# Patient Record
Sex: FEMALE | Race: OTHER | HISPANIC OR LATINO | Employment: UNEMPLOYED | URBAN - METROPOLITAN AREA
[De-identification: names, ages, dates, MRNs, and addresses within clinical notes are randomized per-mention and may not be internally consistent; named-entity substitution may affect disease eponyms.]

---

## 2023-03-24 ENCOUNTER — OFFICE VISIT (OUTPATIENT)
Dept: FAMILY MEDICINE | Facility: CLINIC | Age: 23
End: 2023-03-24
Payer: MEDICAID

## 2023-03-24 VITALS
HEIGHT: 61 IN | WEIGHT: 170 LBS | DIASTOLIC BLOOD PRESSURE: 56 MMHG | BODY MASS INDEX: 32.1 KG/M2 | OXYGEN SATURATION: 99 % | TEMPERATURE: 99 F | RESPIRATION RATE: 18 BRPM | HEART RATE: 61 BPM | SYSTOLIC BLOOD PRESSURE: 100 MMHG

## 2023-03-24 DIAGNOSIS — R51.9 NONINTRACTABLE HEADACHE, UNSPECIFIED CHRONICITY PATTERN, UNSPECIFIED HEADACHE TYPE: ICD-10-CM

## 2023-03-24 DIAGNOSIS — R45.89 DEPRESSED MOOD: ICD-10-CM

## 2023-03-24 DIAGNOSIS — Z3A.01 LESS THAN 8 WEEKS GESTATION OF PREGNANCY: Primary | ICD-10-CM

## 2023-03-24 DIAGNOSIS — Z32.01 PREGNANCY TEST POSITIVE: ICD-10-CM

## 2023-03-24 LAB
B-HCG UR QL: POSITIVE
CTP QC/QA: YES

## 2023-03-24 PROCEDURE — 81025 URINE PREGNANCY TEST: CPT | Mod: PBBFAC

## 2023-03-24 PROCEDURE — 99204 OFFICE O/P NEW MOD 45 MIN: CPT | Mod: PBBFAC

## 2023-03-24 NOTE — PROGRESS NOTES
[facility] Office Visit Note    Subjective:       Patient ID: Meeta Li is a 22 y.o. female.     used for entire duration of visit    HPI:  22 y.o. female presents to [facility] clinic for positive UPT at home.    Current Concerns: None, would like to establish prenatal care here  Nursing assessment with PHQ9 score of 12 - reports onset since finding out she is pregnant. States desire to keep baby. Denies any SI/HI.  Having some abd cramps with constipation. Denies any vaginal bleeding.  +nausea, denies vomiting, but comes close    Gestational History:  (date, GA, length labor, BW, sex, type, anes, place, complications)  - G1: vaginal, girl, full term, 2019, 3.3kg; no complications (in [place])  - G2: ectopic s/p SAB 2022 - stated she went to initial prenatal visit and fetus was found in fallopian tube; experienced SAB about 2wks after  - G3: current    Gyn History:   - LMP: 1/28/2023  - Age at menarche: 14 years  - Menstrual hx: regular, 30day cycles, 4pads/day, 4-5days days per period  - History of birth control: ocps  - History of STDs and/or Abnormal PAPs: denies, n/a pap    Past Medical History: denies  Surgical History: denies  Family History: mom - HTN, heart disease, seizures in grandmother  Social History: no tobacco, etoh, illicit drug use  Medications: denies    Antepartum specific ROS  - Vaginal bleeding: no  - Vaginal discharge: yes, clear. little  - Loss of fluid: no  - Contractions: no  - Headaches: yes, resolves with rest after 2 hrs, then return and lasts another few hours, drinks 3-4 0.5L bottles  - Vision changes: no  - Edema: no    General ROS  Constitutional: no fever, no chills, no weight loss.  CV: no swelling, no edema, no chest pain.  : no urinary retention, no urinary incontinence, denies dysuria  GI: see above hpi  RESP: no SOB, no wheezing, no difficulty breathing  Psych: see above hpi    Objective:      BP (!) 100/56 (BP Location: Right arm,  "Patient Position: Sitting, BP Method: Medium (Automatic))   Pulse 61   Temp 98.5 °F (36.9 °C) (Oral)   Resp 18   Ht 5' 1.02" (1.55 m)   Wt 77.1 kg (170 lb)   LMP 01/28/2023 (Exact Date)   SpO2 99%   BMI 32.10 kg/m²   Physical Exam:  Gen: alert and oriented, NAD  Resp: CTA bilaterally, nonlabored breathing  CV: RRR, no murmurs, no edema  Abd: gravid, nontender, +BS    No current outpatient medications       Assessment/Plan:     1. Less than 8 weeks gestation of pregnancy  Ambulatory referral/consult to Obstetrics / Gynecology      2. Pregnancy test positive  POCT urine pregnancy      3. Depressed mood            Estimated 7^6wga by LMP, referral to initial OB placed.   Pt interested in counseling services and prefers counseling over medical treatment at this time. SW messaged  Discussed increased po hydration for headache, however, pt may take tylenol if needed. Discussed to monitor cramps - may be expected during early pregnancy, but onset of vaginal bleeding would be concerning.     - OB protocol  - cont PNVs  - Strict labor and ED precautions discussed in depth: Fever, vaginal bleeding or leaking fluid, belly cramping or pain, shortness of breath, chest pain, swelling of the face, hands, ankles, feet, or leg; decreased fetal movement, changes in vision, severe headache that does not resolve with rest or Tylenol.    "

## 2023-03-28 ENCOUNTER — TELEPHONE (OUTPATIENT)
Dept: FAMILY MEDICINE | Facility: CLINIC | Age: 23
End: 2023-03-28

## 2023-03-28 NOTE — TELEPHONE ENCOUNTER
3/28/23    LCSW and SW Intern received a referral from Dr. Coker for assistance in finding counseling resources for the patient. SW Intern contacted the patient through the [place] language line [Interpretor: Jessica #[id number]]. The patient stated that she is still interested in receiving resources for counseling services. She stated that she would prefer in-person counseling. SW Intern offered to look into these resources that may be appropriate for the patient and in network with her insurance. SW Intern will contact the patient to share resources and will continue to follow up.  ------------------------------------------  3/29/23    SW Intern contacted the patient through the [place] language line [Interpretor: Dwain #[id number]] to share language appropriate counseling resources. The patient stated that she would prefer to have counseling resources mailed to her home. SW Intern confirmed the patient's address and LCSW mailed these resources to the patient's home in [place] and English:  Postpartum [employer] Support Groups in [place]  -Free, online support groups  - https://www.postpartum.net/en-espanol/  - 7-866-917-0450  SANIYA Batista, Counseling Intern   [address] [place] [address], [facility]   JUDE Muse [address]   Phone: [phone]   Bilingual; $60 a session   Private pay only; not in network with your insurance   [email or web address]  [facility] [address], [place] [address] [phone]  **They have an  line and some  availability**  -In network with your insurance  -Business Hours: Mon.-Fri. 8am-4:30pm  -[email or web address]   -Offers in-person and telehealth appointments  -Referral Process: Patient can call to initiate services  Merged with [facility]  -[phone]  -Intern who speaks [place] offering virtual counseling  -No fee for services    LCSW and LISSETH Intern will continue to follow  up.  ---------------------------------------------  4/6/23    LISSETH Craig attempted to contact the patient through the [place] language line [Interpretor: #[id number]] to follow up on whether she received counseling resources in the mail. However, the call was unsuccessful and LISSETH Craig was unable to leave a VM. LISSETH Craig will continue to follow up.  -----------------------------------------------  4/12/23    Tran Mason, contacted the patient through the [place] language line [Interpretor: Dinorah #[id number]]. The patient stated that she is doing well at this time, and she no longer feels that it is necessary to do counseling. LISSETH Craig encouraged the patient to call if anything changes and she decides that she would like to continue with a counselor. The patient agreed. LCSW and LISSETH Craig will be available as needed to provide further resources and support.

## 2023-03-31 ENCOUNTER — HOSPITAL ENCOUNTER (EMERGENCY)
Facility: HOSPITAL | Age: 23
Discharge: HOME | End: 2023-03-31
Attending: STUDENT IN AN ORGANIZED HEALTH CARE EDUCATION/TRAINING PROGRAM
Payer: MEDICAID

## 2023-03-31 VITALS
TEMPERATURE: 98 F | BODY MASS INDEX: 33.76 KG/M2 | WEIGHT: 171.94 LBS | SYSTOLIC BLOOD PRESSURE: 110 MMHG | DIASTOLIC BLOOD PRESSURE: 76 MMHG | HEIGHT: 60 IN | OXYGEN SATURATION: 98 % | HEART RATE: 68 BPM | RESPIRATION RATE: 16 BRPM

## 2023-03-31 DIAGNOSIS — N76.0 BV (BACTERIAL VAGINOSIS): Primary | ICD-10-CM

## 2023-03-31 DIAGNOSIS — O23.11 ACUTE CYSTITIS DURING PREGNANCY IN FIRST TRIMESTER: ICD-10-CM

## 2023-03-31 DIAGNOSIS — B96.89 BV (BACTERIAL VAGINOSIS): Primary | ICD-10-CM

## 2023-03-31 LAB
ALBUMIN SERPL-MCNC: 3.8 G/DL (ref 3.5–5)
ALBUMIN/GLOB SERPL: 1.1 RATIO (ref 1.1–2)
ALP SERPL-CCNC: 71 UNIT/L (ref 40–150)
ALT SERPL-CCNC: 33 UNIT/L (ref 0–55)
APPEARANCE UR: CLEAR
AST SERPL-CCNC: 17 UNIT/L (ref 5–34)
B-HCG FREE SERPL-ACNC: ABNORMAL MIU/ML
BACTERIA #/AREA URNS AUTO: ABNORMAL /HPF
BASOPHILS # BLD AUTO: 0.04 X10(3)/MCL (ref 0–0.2)
BASOPHILS NFR BLD AUTO: 0.4 %
BILIRUB UR QL STRIP.AUTO: NEGATIVE MG/DL
BILIRUBIN DIRECT+TOT PNL SERPL-MCNC: 0.2 MG/DL
BUN SERPL-MCNC: 7.3 MG/DL (ref 7–18.7)
C TRACH DNA SPEC QL NAA+PROBE: NOT DETECTED
CALCIUM SERPL-MCNC: 9.2 MG/DL (ref 8.4–10.2)
CAOX CRY URNS QL MICRO: ABNORMAL /HPF
CHLORIDE SERPL-SCNC: 108 MMOL/L (ref 98–107)
CLUE CELLS VAG QL WET PREP: ABNORMAL
CO2 SERPL-SCNC: 22 MMOL/L (ref 22–29)
COLOR UR AUTO: YELLOW
CREAT SERPL-MCNC: 0.69 MG/DL (ref 0.55–1.02)
EOSINOPHIL # BLD AUTO: 0.12 X10(3)/MCL (ref 0–0.9)
EOSINOPHIL NFR BLD AUTO: 1.1 %
ERYTHROCYTE [DISTWIDTH] IN BLOOD BY AUTOMATED COUNT: 13.7 % (ref 11.5–17)
GFR SERPLBLD CREATININE-BSD FMLA CKD-EPI: >60 MLS/MIN/1.73/M2
GLOBULIN SER-MCNC: 3.5 GM/DL (ref 2.4–3.5)
GLUCOSE SERPL-MCNC: 74 MG/DL (ref 74–100)
GLUCOSE UR QL STRIP.AUTO: NORMAL MG/DL
HCT VFR BLD AUTO: 39.3 % (ref 37–47)
HGB BLD-MCNC: 13.1 G/DL (ref 12–16)
HYALINE CASTS #/AREA URNS LPF: ABNORMAL /LPF
IMM GRANULOCYTES # BLD AUTO: 0.05 X10(3)/MCL (ref 0–0.04)
IMM GRANULOCYTES NFR BLD AUTO: 0.5 %
KETONES UR QL STRIP.AUTO: NEGATIVE MG/DL
LEUKOCYTE ESTERASE UR QL STRIP.AUTO: 250 UNIT/L
LYMPHOCYTES # BLD AUTO: 3.56 X10(3)/MCL (ref 0.6–4.6)
LYMPHOCYTES NFR BLD AUTO: 32.8 %
MCH RBC QN AUTO: 28.1 PG (ref 27–31)
MCHC RBC AUTO-ENTMCNC: 33.3 G/DL (ref 33–36)
MCV RBC AUTO: 84.3 FL (ref 80–94)
MONOCYTES # BLD AUTO: 0.71 X10(3)/MCL (ref 0.1–1.3)
MONOCYTES NFR BLD AUTO: 6.5 %
MUCOUS THREADS URNS QL MICRO: ABNORMAL /LPF
N GONORRHOEA DNA SPEC QL NAA+PROBE: NOT DETECTED
NEUTROPHILS # BLD AUTO: 6.38 X10(3)/MCL (ref 2.1–9.2)
NEUTROPHILS NFR BLD AUTO: 58.7 %
NITRITE UR QL STRIP.AUTO: NEGATIVE
NRBC BLD AUTO-RTO: 0 %
PH UR STRIP.AUTO: 5.5 [PH]
PLATELET # BLD AUTO: 282 X10(3)/MCL (ref 130–400)
PMV BLD AUTO: 11.4 FL (ref 7.4–10.4)
POTASSIUM SERPL-SCNC: 3.7 MMOL/L (ref 3.5–5.1)
PROT SERPL-MCNC: 7.3 GM/DL (ref 6.4–8.3)
PROT UR QL STRIP.AUTO: ABNORMAL MG/DL
RBC # BLD AUTO: 4.66 X10(6)/MCL (ref 4.2–5.4)
RBC #/AREA URNS AUTO: ABNORMAL /HPF
RBC UR QL AUTO: NEGATIVE UNIT/L
SODIUM SERPL-SCNC: 136 MMOL/L (ref 136–145)
SP GR UR STRIP.AUTO: 1.02
SQUAMOUS #/AREA URNS LPF: ABNORMAL /HPF
T VAGINALIS VAG QL WET PREP: ABNORMAL
UROBILINOGEN UR STRIP-ACNC: NORMAL MG/DL
WBC # SPEC AUTO: 10.9 X10(3)/MCL (ref 4.5–11.5)
WBC #/AREA URNS AUTO: ABNORMAL /HPF
WBC #/AREA VAG WET PREP: ABNORMAL
YEAST SPEC QL WET PREP: ABNORMAL

## 2023-03-31 PROCEDURE — 87210 SMEAR WET MOUNT SALINE/INK: CPT | Performed by: NURSE PRACTITIONER

## 2023-03-31 PROCEDURE — 80053 COMPREHEN METABOLIC PANEL: CPT | Performed by: NURSE PRACTITIONER

## 2023-03-31 PROCEDURE — 84702 CHORIONIC GONADOTROPIN TEST: CPT | Performed by: NURSE PRACTITIONER

## 2023-03-31 PROCEDURE — 81001 URINALYSIS AUTO W/SCOPE: CPT | Performed by: NURSE PRACTITIONER

## 2023-03-31 PROCEDURE — 87591 N.GONORRHOEAE DNA AMP PROB: CPT | Performed by: NURSE PRACTITIONER

## 2023-03-31 PROCEDURE — 99284 EMERGENCY DEPT VISIT MOD MDM: CPT | Mod: 25

## 2023-03-31 PROCEDURE — 85025 COMPLETE CBC W/AUTO DIFF WBC: CPT | Performed by: NURSE PRACTITIONER

## 2023-03-31 RX ORDER — METRONIDAZOLE 500 MG/1
500 TABLET ORAL EVERY 12 HOURS
Qty: 14 TABLET | Refills: 0 | Status: SHIPPED | OUTPATIENT
Start: 2023-03-31 | End: 2023-04-07

## 2023-03-31 RX ORDER — AMOXICILLIN AND CLAVULANATE POTASSIUM 500; 125 MG/1; MG/1
1 TABLET, FILM COATED ORAL 2 TIMES DAILY
Qty: 14 TABLET | Refills: 0 | Status: SHIPPED | OUTPATIENT
Start: 2023-03-31 | End: 2023-04-07

## 2023-03-31 RX ORDER — METOCLOPRAMIDE 10 MG/1
10 TABLET ORAL EVERY 6 HOURS PRN
Qty: 16 TABLET | Refills: 0 | Status: SHIPPED | OUTPATIENT
Start: 2023-03-31 | End: 2023-04-04

## 2023-03-31 NOTE — ED PROVIDER NOTES
Encounter Date: 3/31/2023       History     Chief Complaint   Patient presents with    Headache     PT REPORTS SHE IS ABOUT 9 WK OB, CO RANGEL, INTERMITTENT CRAMPING AND YELLOW VAG DC > 1 WK.  LMP 23. .  WAITING ON APT W OB. DENIEDS VAG BLEEDING.     ABD PAIN W PREGNANCY    Vaginal Discharge     Pt is a 22 y.o. female who presents to the [facility] ED complaining of pelvic pain, vaginal discharge which has been present x 1 week. Pt is approx 9 weeks pregnant. . Hx of ectopic pregnancy x 1. LMP was 23. Denies being seen yet by OB Services for this pregnancy. Denies chest pain, SOB, weakness, dizziness, fever, vaginal bleeding, or nausea/vomiting. Pt also reports mild headache which occurred 2 days ago. Denies current headache but describes pain symptoms as worsening with bright lights and mild nausea. Pt is [place] speaking,  used for all verbal communication.    Review of patient's allergies indicates:  No Known Allergies  History reviewed. No pertinent past medical history.  History reviewed. No pertinent surgical history.  History reviewed. No pertinent family history.  Social History     Tobacco Use    Smoking status: Never    Smokeless tobacco: Never     Review of Systems   Constitutional:  Negative for chills, diaphoresis, fatigue and fever.   HENT:  Negative for facial swelling, rhinorrhea, sinus pressure, sinus pain, sore throat and trouble swallowing.    Respiratory:  Negative for cough, chest tightness, shortness of breath and wheezing.    Cardiovascular:  Negative for chest pain, palpitations and leg swelling.   Gastrointestinal:  Positive for abdominal pain and nausea. Negative for diarrhea and vomiting.   Genitourinary:  Positive for pelvic pain and vaginal discharge. Negative for dysuria, flank pain, frequency, hematuria and urgency.   Musculoskeletal:  Negative for arthralgias, back pain, joint swelling and myalgias.   Skin:  Negative for color change and rash.   Neurological:   Positive for headaches. Negative for dizziness, syncope, weakness and light-headedness.   Hematological:  Does not bruise/bleed easily.   All other systems reviewed and are negative.    Physical Exam     Initial Vitals [03/31/23 1342]   BP Pulse Resp Temp SpO2   105/69 66 16 97.7 °F (36.5 °C) 98 %      MAP       --         Physical Exam    Nursing note and vitals reviewed.  Constitutional: She appears well-developed and well-nourished.   HENT:   Head: Normocephalic and atraumatic.   Nose: Nose normal.   Mouth/Throat: Oropharynx is clear and moist.   Eyes: Conjunctivae and EOM are normal. Pupils are equal, round, and reactive to light.   Neck: Neck supple.   Normal range of motion.  Cardiovascular:  Normal rate, regular rhythm, normal heart sounds and intact distal pulses.           Pulmonary/Chest: Effort normal and breath sounds normal. No respiratory distress. She has no wheezes. She has no rhonchi. She has no rales. She exhibits no tenderness.   Abdominal: Abdomen is soft and flat. Bowel sounds are normal. She exhibits no distension. There is abdominal tenderness in the suprapubic area. There is no rebound, no guarding, no tenderness at McBurney's point and negative Randall's sign. negative psoas sign  Genitourinary:    Pelvic exam was performed with patient supine.      Vaginal discharge (yellow, thick) present.      No vaginal tenderness or bleeding.   No tenderness or bleeding in the vagina.    No foreign body in the vagina.     Musculoskeletal:         General: Normal range of motion.      Cervical back: Normal range of motion and neck supple.     Neurological: She is alert and oriented to person, place, and time. She has normal strength and normal reflexes.   Skin: Skin is warm and dry. Capillary refill takes less than 2 seconds.   Psychiatric: She has a normal mood and affect. Her speech is normal and behavior is normal. Judgment and thought content normal.       ED Course   Procedures  Labs Reviewed   WET  PREP, GENITAL - Abnormal; Notable for the following components:       Result Value    Clue Cells, Wet Prep Many (*)     All other components within normal limits   COMPREHENSIVE METABOLIC PANEL - Abnormal; Notable for the following components:    Chloride 108 (*)     All other components within normal limits   URINALYSIS, REFLEX TO URINE CULTURE - Abnormal; Notable for the following components:    Protein, UA Trace (*)     Leukocyte Esterase,  (*)     Bacteria, UA Few (*)     Squamous Epithelial Cells, UA Many (*)     Mucous, UA Occ (*)     Calcium Oxalate Crystals, UA Occ (*)     All other components within normal limits   HCG, QUANTITATIVE - Abnormal; Notable for the following components:    Beta Human Chorionic Gonadotropin Quantitative 114,865.53 (*)     All other components within normal limits   CBC WITH DIFFERENTIAL - Abnormal; Notable for the following components:    MPV 11.4 (*)     IG# 0.05 (*)     All other components within normal limits   CHLAMYDIA/GONORRHOEAE(GC), PCR - Normal    Narrative:     The Xpert CT/NG test, performed on the Vputi system is a qualitative in vitro real-time polymerase chain reaction (PCR) test for the automated detected and differentiation for genomic DNA from Chlamydia trachomatis (CT) and/or Neisseria gonorrhoeae (NG).   CBC W/ AUTO DIFFERENTIAL    Narrative:     The following orders were created for panel order CBC auto differential.  Procedure                               Abnormality         Status                     ---------                               -----------         ------                     CBC with Differential[[id number]]        Abnormal            Final result                 Please view results for these tests on the individual orders.   EXTRA TUBES    Narrative:     The following orders were created for panel order EXTRA TUBES.  Procedure                               Abnormality         Status                     ---------                                -----------         ------                     Light Blue Top Hold[[id number]]                              In process                 Gold Top Hold[[id number]]                                    In process                   Please view results for these tests on the individual orders.   LIGHT BLUE TOP HOLD   GOLD TOP HOLD          Imaging Results              US OB <14 Wks TransAbd & TransVag, Single Gestation (XPD) (Final result)  Result time 03/31/23 18:35:13      Final result by Lex Ruiz MD (03/31/23 18:35:13)                   Impression:      Single viable intrauterine pregnancy with sonographic age of 8 weeks 5 days.  Appropriate fetal heart rate of 171 beats per minute.  Recommend appropriate follow-up with OB.      Electronically signed by: Lex Ruiz  Date:    03/31/2023  Time:    18:35               Narrative:    EXAMINATION:  US OB <14 WEEKS, TRANSABDOM & TRANSVAG, SINGLE GESTATION (XPD)    CLINICAL HISTORY:  Pelvic pain;    TECHNIQUE:  Transabdominal and transvaginal images of the pelvis were obtained. Images obtained in grayscale and color.    COMPARISON:  No prior imaging available for comparison.    FINDINGS:  Single viable intrauterine pregnancy with sonographic age of 8 weeks 5 days.  Appropriate fetal heart rate of 171 beats per minute.                                       Medications - No data to display  Medical Decision Making:   Differential Diagnosis:   UTI  Ectopic pregnancy  BV  VD  Anemia  Electrolyte imbalance  Clinical Tests:   Lab Tests: Ordered and Reviewed  Radiological Study: Ordered and Reviewed  ED Management:  6:47 PM Reassessed patient at this time. Reports initial pelvic discomfort has improved. I have discussed with pt in detail all of her results including her preliminary US results. Pt will be placed on medication for her BV as well as her UTI. Pt reports no being informed of her OB appointment date yet so I will create my own referral to ensure OB follow up  occurs. Denies being on a prenatal vitamin also. Discussed with patient all pertinent ED information and results. Discussed diagnosis and treatment plan with patient. Follow up instructions and return to ED instruction have been given. All questions and concerns were addressed at this time. Patient voices understanding of information and instructions. Patient is comfortable with plan and discharge. Patient is stable for discharge.        APC / Resident Notes:   Was not physically present during the history or exam of this patient.  Was available all times for consultation. (Nasim)                      Clinical Impression:   Final diagnoses:  [N76.0, B96.89] BV (bacterial vaginosis) (Primary)  [O23.11] Acute cystitis during pregnancy in first trimester        ED Disposition Condition    Discharge Stable          ED Prescriptions       Medication Sig Dispense Start Date End Date Auth. Provider    metroNIDAZOLE (FLAGYL) 500 MG tablet Take 1 tablet (500 mg total) by mouth every 12 (twelve) hours. Do not drink alcohol while on this medication for 7 days 14 tablet 3/31/2023 4/7/2023 Oliver Molina Jr., KAYDENP    metoclopramide HCl (REGLAN) 10 MG tablet Take 1 tablet (10 mg total) by mouth every 6 (six) hours as needed (nausea). 16 tablet 3/31/2023 4/4/2023 Oliver Molina Jr., KAYDENP    amoxicillin-clavulanate 500-125mg (AUGMENTIN) 500-125 mg Tab Take 1 tablet (500 mg total) by mouth 2 (two) times daily. for 7 days 14 tablet 3/31/2023 4/7/2023 Oliver Molina Jr., KAYDENP    PNV 11-iron fum-folic acid-om3 28 mg iron-1 mg -200 mg Cap Take 1 capsule by mouth Daily. 30 each 3/31/2023 3/30/2024 ALLAN Self Jr.          Follow-up Information    None          Oliver Molina Jr., ALLAN  03/31/23 1910       Jassi Rouse MD  03/2002

## 2023-05-16 ENCOUNTER — HOSPITAL ENCOUNTER (OUTPATIENT)
Dept: RADIOLOGY | Facility: HOSPITAL | Age: 23
Discharge: HOME | End: 2023-05-16
Attending: OBSTETRICS & GYNECOLOGY
Payer: MEDICAID

## 2023-05-16 ENCOUNTER — OFFICE VISIT (OUTPATIENT)
Dept: FAMILY MEDICINE | Facility: CLINIC | Age: 23
End: 2023-05-16
Payer: MEDICAID

## 2023-05-16 VITALS
RESPIRATION RATE: 20 BRPM | HEIGHT: 60 IN | HEART RATE: 79 BPM | DIASTOLIC BLOOD PRESSURE: 69 MMHG | TEMPERATURE: 98 F | BODY MASS INDEX: 32.32 KG/M2 | WEIGHT: 164.63 LBS | OXYGEN SATURATION: 99 % | SYSTOLIC BLOOD PRESSURE: 102 MMHG

## 2023-05-16 DIAGNOSIS — Z3A.01 LESS THAN 8 WEEKS GESTATION OF PREGNANCY: ICD-10-CM

## 2023-05-16 DIAGNOSIS — Z34.90 PREGNANCY: ICD-10-CM

## 2023-05-16 DIAGNOSIS — K21.9 GASTROESOPHAGEAL REFLUX DISEASE, UNSPECIFIED WHETHER ESOPHAGITIS PRESENT: ICD-10-CM

## 2023-05-16 DIAGNOSIS — Z3A.15 15 WEEKS GESTATION OF PREGNANCY: Primary | ICD-10-CM

## 2023-05-16 LAB
APPEARANCE UR: CLEAR
BACTERIA #/AREA URNS AUTO: ABNORMAL /HPF
BASOPHILS # BLD AUTO: 0.03 X10(3)/MCL
BASOPHILS NFR BLD AUTO: 0.3 %
BILIRUB SERPL-MCNC: NORMAL MG/DL
BILIRUB UR QL STRIP.AUTO: NEGATIVE MG/DL
BLOOD URINE, POC: NORMAL
CLARITY, POC UA: NORMAL
COLOR UR: YELLOW
COLOR, POC UA: NORMAL
EOSINOPHIL # BLD AUTO: 0.12 X10(3)/MCL (ref 0–0.9)
EOSINOPHIL NFR BLD AUTO: 1.3 %
ERYTHROCYTE [DISTWIDTH] IN BLOOD BY AUTOMATED COUNT: 13.9 % (ref 11.5–17)
GLUCOSE UR QL STRIP.AUTO: NORMAL MG/DL
GLUCOSE UR QL STRIP: NORMAL
GROUP & RH: NORMAL
HBV SURFACE AG SERPL QL IA: NONREACTIVE
HCT VFR BLD AUTO: 40.1 % (ref 37–47)
HCV AB SERPL QL IA: NONREACTIVE
HGB BLD-MCNC: 13.5 G/DL (ref 12–16)
HIV 1+2 AB+HIV1 P24 AG SERPL QL IA: NONREACTIVE
HYALINE CASTS #/AREA URNS LPF: ABNORMAL /LPF
IMM GRANULOCYTES # BLD AUTO: 0.03 X10(3)/MCL (ref 0–0.04)
IMM GRANULOCYTES NFR BLD AUTO: 0.3 %
INDIRECT COOMBS GEL: NORMAL
KETONES UR QL STRIP.AUTO: ABNORMAL MG/DL
KETONES UR QL STRIP: 15
LEUKOCYTE ESTERASE UR QL STRIP.AUTO: 75 UNIT/L
LEUKOCYTE ESTERASE URINE, POC: NORMAL
LYMPHOCYTES # BLD AUTO: 3.13 X10(3)/MCL (ref 0.6–4.6)
LYMPHOCYTES NFR BLD AUTO: 33.1 %
MCH RBC QN AUTO: 29 PG (ref 27–31)
MCHC RBC AUTO-ENTMCNC: 33.7 G/DL (ref 33–36)
MCV RBC AUTO: 86.2 FL (ref 80–94)
MONOCYTES # BLD AUTO: 0.51 X10(3)/MCL (ref 0.1–1.3)
MONOCYTES NFR BLD AUTO: 5.4 %
MUCOUS THREADS URNS QL MICRO: ABNORMAL /LPF
NEUTROPHILS # BLD AUTO: 5.63 X10(3)/MCL (ref 2.1–9.2)
NEUTROPHILS NFR BLD AUTO: 59.6 %
NITRITE UR QL STRIP.AUTO: NEGATIVE
NITRITE, POC UA: NORMAL
NRBC BLD AUTO-RTO: 0 %
PH UR STRIP.AUTO: 6.5 [PH]
PH, POC UA: 7
PLATELET # BLD AUTO: 254 X10(3)/MCL (ref 130–400)
PMV BLD AUTO: 12.1 FL (ref 7.4–10.4)
PROT UR QL STRIP.AUTO: ABNORMAL MG/DL
PROTEIN, POC: 30
RBC # BLD AUTO: 4.65 X10(6)/MCL (ref 4.2–5.4)
RBC #/AREA URNS AUTO: ABNORMAL /HPF
RBC UR QL AUTO: NEGATIVE UNIT/L
SP GR UR STRIP.AUTO: 1.02
SPECIFIC GRAVITY, POC UA: 1.02
SPECIMEN OUTDATE: NORMAL
SQUAMOUS #/AREA URNS LPF: ABNORMAL /HPF
T PALLIDUM AB SER QL: NONREACTIVE
UROBILINOGEN UR STRIP-ACNC: ABNORMAL MG/DL
UROBILINOGEN, POC UA: 1
WBC # SPEC AUTO: 9.45 X10(3)/MCL (ref 4.5–11.5)
WBC #/AREA URNS AUTO: ABNORMAL /HPF

## 2023-05-16 PROCEDURE — 76805 OB US >/= 14 WKS SNGL FETUS: CPT | Mod: TC

## 2023-05-16 PROCEDURE — 36415 COLL VENOUS BLD VENIPUNCTURE: CPT

## 2023-05-16 PROCEDURE — 86780 TREPONEMA PALLIDUM: CPT

## 2023-05-16 PROCEDURE — 85025 COMPLETE CBC W/AUTO DIFF WBC: CPT

## 2023-05-16 PROCEDURE — 87389 HIV-1 AG W/HIV-1&-2 AB AG IA: CPT

## 2023-05-16 PROCEDURE — 86762 RUBELLA ANTIBODY: CPT | Mod: 90

## 2023-05-16 PROCEDURE — 85660 RBC SICKLE CELL TEST: CPT

## 2023-05-16 PROCEDURE — 81511 FTL CGEN ABNOR FOUR ANAL: CPT | Mod: 90

## 2023-05-16 PROCEDURE — 99214 OFFICE O/P EST MOD 30 MIN: CPT | Mod: PBBFAC,25

## 2023-05-16 PROCEDURE — 86803 HEPATITIS C AB TEST: CPT

## 2023-05-16 PROCEDURE — 81001 URINALYSIS AUTO W/SCOPE: CPT

## 2023-05-16 PROCEDURE — 86787 VARICELLA-ZOSTER ANTIBODY: CPT | Mod: 90

## 2023-05-16 PROCEDURE — 81002 URINALYSIS NONAUTO W/O SCOPE: CPT | Mod: PBBFAC

## 2023-05-16 PROCEDURE — 86900 BLOOD TYPING SEROLOGIC ABO: CPT

## 2023-05-16 PROCEDURE — 87340 HEPATITIS B SURFACE AG IA: CPT

## 2023-05-16 RX ORDER — CALC/MAG/B COMPLEX/D3/HERB 61
15 TABLET ORAL DAILY
Qty: 30 CAPSULE | Refills: 2 | Status: SHIPPED | OUTPATIENT
Start: 2023-05-16 | End: 2023-06-13 | Stop reason: SDUPTHER

## 2023-05-16 NOTE — PROGRESS NOTES
[facility] OB OFFICE VISIT NOTE  Meeta Li  [id number]  2023    Chief Complaint: Initial Prenatal Visit and Headache (X 1 month comes and goes )      Meeta Li is a 22 y.o. female   at 15w3d GUY 2023 by LMP consistent with 2nd trimester U/S (5/15/23)presenting to [facility] for initial OB visit.    Current Issues: Headache started about a month ago, intermittent, rated 8/10. Also reports intermittent nausea associated with vomiting    Chronic Issues: Vaginal BV treated with flagyl 3/31/23, depressed mood- sees Lili Orona, LCSW      Gestational History:  (date, GA, length labor, BW, sex, type, anes, place, complications)  - G1: vaginal, girl, full term, 2019, 3.3kg; no complications (in [place])  - G2: ectopic s/p SAB  - stated she went to initial prenatal visit and fetus was found in fallopian tube? experienced SAB about 2wks after  - G3: current      Gyn History:   - LMP: 23  - Age at menarche: 14 years  - Menstrual hx: : regular, 30day cycles, 4pads/day, 4-5days days per period  - History of birth control: OCPs  - History of STDs and/or Abnormal PAPs: denies  - History of prior : none    Past Medical History: mom - HTN, heart disease, seizures in grandmother  Surgical History: denies  Family History: mom - HTN, heart disease, seizures in grandmother  Social History: denies tobacco use, etoh  Medications: PNV      Review of Systems  Gen: no fever, chills  Heart: no chest pain  Lungs: no SOB  : no hematuria  Abd: no diarrhea, vomiting, abd pain    Antepartum specific   - Fetal movements: no  - Vaginal bleeding: no  - Vaginal discharge: no  - Loss of fluid: no  - Contractions: no  - Headaches: yes (intermittent)  - Vision changes: no  - Edema: no    Blood pressure 102/69, pulse 79, temperature 98.1 °F (36.7 °C), temperature source Oral, resp. rate 20, height 5' (1.524 m), weight 74.7 kg (164 lb 9.6 oz), last menstrual period 2023, SpO2 99 %.   Physical Exam  Gen: in no  acute distress  CVS: RRR, no r/g/m  Lungs: CTABL  Abd: gravid, nR, +BS  Psych: AOx3  FHT: 145 by doppler US      Current Medications:   Current Outpatient Medications   Medication Sig Dispense Refill    PNV 11-iron fum-folic acid-om3 28 mg iron-1 mg -200 mg Cap Take 1 capsule by mouth Daily. 30 each 1     No current facility-administered medications for this visit.       Labs:  Urine dipstick:   Component 09:05   Color, UA Dark Yellow    pH, UA 7.0    WBC, UA small    Nitrite, UA neg    Protein, POC 30    Glucose, UA neg    Ketones, UA 15    Urobilinogen, UA 1.0    Bilirubin, POC neg    Blood, UA neg    Clarity, UA Slightly Cloudy    Spec Grav UA 1.020        Initial OB Labs ordered   - Blood Type and Rh:   - Antibody Screen:   - CBC H/H:   - HIV:   - RPR:   - GC: not detected 3/31/23  - CT: not detected 3/31/23  - HBsAg:   - HCVAb:   - Rubella:   - Varicella:   - UA & Culture:   - Sickle Cell Screen:   - PAP: Not indicated due to age (age 22)  - Influenza vaccine date: out of date    15-20 Weeks Lab  Ordered  - Quad Screen:     28 Week Lab  - 1H GTT:   - Rhogam:   - Date of Tdap:   - CBC H/H:   - RPR:   - BTL consent:     37 Week Lab  - CBC H/H:   - RPR:   - GBS Culture:   - HIV:   - Cervical GC:     Imaging:   Initial US: 5/15/23  Impression:   Single intrauterine pregnancy with a viable fetus in a breech presentation with a fetal heart rate of 154 beats per minute.   Estimated age by ultrasound 15 weeks and 4 days +/-1 week 1 day.   Estimated date of delivery by ultrasound November 3, 2023   Estimated fetal weight (had lock) 45.3%          Anatomy Scan:    Assessment:   1. 15 weeks gestation of pregnancy          Plan:  - OB Protocol   - Continue PNVs  - Urine dip reviewed as above  - Indicated labs: PENDING  - Mother plans to breast feeding  - Postpartum contraception discussion: not discussed  - Labor precautions discussed in depth  -ED precautions given    Acid Reflux  -Reports intermittent nausea associated  with vomiting  -Rx sent for Prevacid   -reassess next follow up    Headache  -Intermittent, rated 7-8/10  -Advised on tylenol   -wnl    - Return to clinic in 4 weeks     Julian Dolan  [facility] HO-1

## 2023-05-16 NOTE — PATIENT INSTRUCTIONS
Well Child Exam    About this topic  A well child exam is a visit with your child's doctor to check your child's health. The doctor will check your child's growth, progress, and shot record. It is also a time for you to ask your child's doctor any questions you have about your child's health. Your child will have a full exam during the office visit. Other things that are sometimes checked are hearing, eyesight, and urine or blood tests. The doctor may give shots during your child's well visit.    General    Getting Ready for a Well Child Exam    A well child exam is a good time for you to talk with your child's doctor about any of these topics:    Eating habits or diet    How your child acts    Sleep issues    Growth    Safety    Vaccines    Toilet training    Teen years    How your child is doing in school or any learning concerns    Home life    You may want to make a written list of the things you want to talk about with your child's doctor. Be sure to bring your list of questions to your child's well visit. You may also want to do some research on your own before your office visit by reading books or looking at Web sites. Other family members, child caregivers, and grandparents may be able to help you too. Your child's doctor may ask also you about your family's health history or if your child is around anyone who smokes.    The Exam    The doctor measures your child's weight, height, and sometimes head size or body mass index (BMI). The doctor plots these numbers on a growth curve. The growth curve gives a picture of your baby's growth at each visit. The doctor may check your child's temperature, blood pressure, breathing, and heart rate. The doctor may listen to your child's heart, lungs, and belly. Your doctor will do a full exam of your child from the head to the toes.    Growth and Development Questions    Your doctor will ask you about your child's progress. The doctor will focus on the skills that are  likely to happen at your child's age. Some of these are motor skills like rolling over, walking, and running, while others are social skills, or how your child interacts with other people. Your child's doctor will also ask you how your child is doing in school.    Help for Parents    Your doctor will talk with you about any concerns you have about your child during this visit. The doctor may also talk with you about:    Getting family help or other support    Ways to help your child's brain growth    How your child plays and acts with others    Ways to help your child exercise    Safety    Eating habits    Vaccines    Quitting smoking    Help if you have a low mood after having a baby    Shots or Vaccines    It is important for your child to get shots on time. This protects from very serious illnesses like pertussis, measles, or some kinds of pneumonia. Sometimes, your child may need more than one dose of vaccine. The vaccines used today are safer than ever. Talk to your doctor if you have any questions or concerns about giving your child vaccines.    Well Child Exam Schedule    The American Academy of Pediatrics (AAP) suggests this plan for well child visits:    [place] (3 to 5 days old)    1 month old    2 months old    4 months old    6 months old    9 months old    12 months old    15 months old    18 months old    2 years old    30 months old    3 years old    4 years old    Once each year until age 21    Well child exams are very important. Since your child is healthy at this visit and it is scheduled ahead of time, you can think about things you want to ask your child's doctor. Be sure to follow the above plan for well child visits as well as any other visits your child's doctor suggests.    Where can I learn more?    Centers for Disease Control and Prevention    http://www.cdc.gov/vaccines     Healthy  Children    https://www.healthychildren.org/English/family-life/health-management/Pages/Well-Child-Care-A-Check-Up-for-Success.aspx    Disclaimer.  This generalized information is a limited summary of diagnosis, treatment, and/or medication information. It is not meant to be comprehensive and should be used as a tool to help the user understand and/or assess potential diagnostic and treatment options. It does NOT include all information about conditions, treatments, medications, side effects, or risks that may apply to a specific patient. It is not intended to be medical advice or a substitute for the medical advice, diagnosis, or treatment of a health care provider based on the health care provider's examination and assessment of a patients specific and unique circumstances. Patients must speak with a health care provider for complete information about their health, medical questions, and treatment options, including any risks or benefits regarding use of medications. This information does not endorse any treatments or medications as safe, effective, or approved for treating a specific patient. [employer] and its affiliates disclaim any warranty or liability relating to this information or the use thereof. The use of this information is governed by the Terms of Use, available at Terms of Use. ©2022 [employer], Inc. and its affiliates and/or licensors. All rights reserved.

## 2023-05-17 DIAGNOSIS — Z3A.15 15 WEEKS GESTATION OF PREGNANCY: Primary | ICD-10-CM

## 2023-05-17 LAB
HGB S BLD QL SOLY: NEGATIVE
RUBV IGG SERPL IA-ACNC: 1.3
RUBV IGG SERPL QL IA: POSITIVE
VZV IGG SER IA-ACNC: 1.9
VZV IGG SER QL IA: POSITIVE

## 2023-05-17 NOTE — PROGRESS NOTES
I have personally reviewed the review of systems (ROS) and past, family and social histories (PFSH) documented above by the resident.  I have reviewed the care furnished by the resident during the encounter, including a review of the patient's medical history, the resident's findings on physical examination, diagnosis, and the treatment plan.  I participated in the management of the patient and was immediately available throughout the encounter.   I was physically present during all key portions of the service(s) provided with the resident.  Services were furnished in a primary care center located in the outpatient department of a [facility].

## 2023-05-19 LAB
# FETUSES: NORMAL
2ND TRIMESTER 4 SCREEN SERPL-IMP: NORMAL
AFP ADJ MOM SERPL: 0.72 MOM
AFP SERPL IA-MCNC: 21.2 NG/ML
AGE AT DELIVERY: NORMAL
B-HCG ADJ MOM SERPL: 1.08 MOM
CHORION TYPE: NORMAL
COLLECT DATE: NORMAL
CURRENT SMOKER: NORMAL
FET TS 21 RISK FROM MAT AGE: NORMAL
GA METHOD: NORMAL
GA US.COMPOSITE.EST: NORMAL WK,D
HCG SERPL IA-ACNC: 43.5 IU/ML
HX OF NTD QL: NO
HX OF NTD QL: NO
HX OF TRISOMY 21 QL: NO
IDDM PATIENT QL: NO
INHIBIN A ADJ MOM SERPL: 0.99 MOM
INHIBIN SERPL-MCNC: 148 PG/ML
IVF PREGNANCY: NO
LABORATORY COMMENT REPORT: NORMAL
M PHYSICIAN PHONE NUMBER: NORMAL
MATERNAL RISK FACTORS: NORMAL
NEURAL TUBE DEFECT RISK FETUS: NORMAL %
RECOM F/U: NORMAL
TEST PERFORMANCE INFO SPEC: NORMAL
TS 18 RISK FETUS: NORMAL
TS 21 RISK FETUS: NORMAL
U ESTRIOL ADJ MOM SERPL: 0.99 MOM
U ESTRIOL SERPL-MCNC: 0.65 NG/ML

## 2023-06-12 NOTE — PROGRESS NOTES
OB Office Visit Note    Name: Meeta Li  MRN: [id number]  Date: 2023    Subjective:      Chief Complaint: Routine Prenatal Visit (OB 19^3)      Meeta Li is a 22 y.o.  at 19w3d with GUY 2023, by Last Menstrual Period here for routine OB visit.     Current issues: concerned for having parasites in her feces, visible white and small moving spots on stool since 2 weeks. No diarrhea, rather patient is constipated, has BM every 3-4 days and her stool amount is small amount. Has not been taking PNV because it causes her to vomit. Last BM yesterday, small amount.   Also c/o upper abdominal pain that is worse with eating.     Chronic issues: none    Antepartum specific ROS  - Fetal movements: Yes - flutters  - Vaginal bleeding: No  - Vaginal discharge: Yes - small, yellow  - Loss of fluid: No  - Contractions: No  - Headaches: No  - Vision changes: No  - Edema: No      Review of Systems  Constitutional: no fever, no chills  CV: no chest pain  RESP: no SOB  : no dysuria, no hematuria  GI: +constipation, no diarrhea, no nausea, no vomiting  Psych: no depression, no anxiety; No SI/HI      Meds:   Prior to Admission medications    Medication Sig Start Date End Date Taking? Authorizing Provider   lansoprazole (PREVACID) 15 MG capsule Take 1 capsule (15 mg total) by mouth once daily. 5/16/23 5/15/24  Julian Dolan MD   PNV 11-iron fum-folic acid-om3 28 mg iron-1 mg -200 mg Cap Take 1 capsule by mouth Daily. 3/31/23 3/30/24  Oliver Molina Jr., FNP     Allergies: Review of patient's allergies indicates:  No Known Allergies    Gestational History:   OB History    Para Term  AB Living   3 1 1 0 1 1   SAB IAB Ectopic Multiple Live Births   1 0 0 0 1      # Outcome Date GA Lbr Porfirio/2nd Weight Sex Delivery Anes PTL Lv   3 Current            2 SAB  7w0d          1 Term 19 40w0d  3.3 kg (7 lb 4.4 oz) F Vag-Spont EPI  BENJA           Objective:      Vitals:    23 0837   BP:  97/67   BP Location: Right arm   Patient Position: Sitting   BP Method: Medium (Automatic)   Pulse: 79   Temp: 98.1 °F (36.7 °C)   TempSrc: Oral   SpO2: 99%   Weight: 75.7 kg (166 lb 12.8 oz)   Height: 5' (1.524 m)         General:   RESP: clear to auscultation bilaterally, non labored  CV: regular rate and rhythm, no murmurs, no edema  ABD: gravid, nontender, BS+ soft, nontender, nondistended, no abnormal masses, no epigastric pain and FHT present  FHTs: 140 bpm;   Fundal height: 20 cm  Cervix: not digitally examined    Initial OB Labs: Ordered 5/16/23  - Blood Type and Rh: O+  - Antibody Screen: negative  - CBC H/H: 13.5/40.1  - HIV: NR  - RPR: NR  - GC: not detected  - CT: not detected  - HBsAg: NR  - HCVAb: NR  - Rubella: immune  - Varicella: immune  - UA & Culture: UA trace protein, trace bacteria, 0-5 WBC  - Sickle Cell Screen: negative  - PAP: not done, pt is 21 yo    15-20 Weeks: Lab Ordered 5/16/23  - Quad Screen: normal risk    - 20 wk anatomy US: appt on 6/21, pending    Imaging  3/31 US OB < 14 wga :Single viable intrauterine pregnancy with sonographic age of 8 weeks 5 days.  Appropriate fetal heart rate of 171 beats per minute.     5/16/23 US OB > 14 wga : Single intrauterine pregnancy with a viable fetus in a breech presentation with a fetal heart rate of 154 beats per minute.   Estimated age by ultrasound 15 weeks and 4 days +/-1 week 1 day.   Estimated date of delivery by ultrasound November 3, 2023   Estimated fetal weight (had lock) 45.3%    Urine dip:  Lab Results   Component Value Date    COLORU Yellow 06/13/2023    SPECGRAV 1.015 06/13/2023    PHUR 8.5 06/13/2023    WBCUR TRACE 06/13/2023    NITRITE NEGATIVE 06/13/2023    PROTEINPOC NEGATIVE 06/13/2023    GLUCOSEUR NEGATIVE 06/13/2023    KETONESU NEGATIVE 06/13/2023    UROBILINOGEN Normal 06/13/2023    BILIRUBINPOC NEGATIVE 06/13/2023    RBCUR NEGATIVE 06/13/2023     Assessment/Plan:     Meeta was seen today for routine prenatal  visit.    Diagnoses and all orders for this visit:    Pregnancy with 19 completed weeks gestation  -     POCT urine dipstick without microscope  -     OB Protocol   -     PNVs  -     Urine dip reviewed as above, sent off for UA with reflex culture  -     Routine (initial) labs: reviewed, as mentioned above  -     Labor precautions discussed in depth    Parasites in stool  -     Stool Exam-Ova,Cysts,Parasites  - Collection kit given to pt, instructed to return it to lab for analysis    Gastroesophageal reflux disease, unspecified whether esophagitis present  -     lansoprazole (PREVACID) 15 MG capsule; Take 1 capsule (15 mg total) by mouth once daily.      Return to clinic in Follow up in about 4 weeks (around 7/11/2023) for routine OB.     Maia Marin MD  [facility]

## 2023-06-13 ENCOUNTER — OFFICE VISIT (OUTPATIENT)
Dept: FAMILY MEDICINE | Facility: CLINIC | Age: 23
End: 2023-06-13
Payer: MEDICAID

## 2023-06-13 VITALS
DIASTOLIC BLOOD PRESSURE: 67 MMHG | SYSTOLIC BLOOD PRESSURE: 97 MMHG | HEART RATE: 79 BPM | HEIGHT: 60 IN | BODY MASS INDEX: 32.75 KG/M2 | OXYGEN SATURATION: 99 % | TEMPERATURE: 98 F | WEIGHT: 166.81 LBS

## 2023-06-13 DIAGNOSIS — B82.9 PARASITES IN STOOL: ICD-10-CM

## 2023-06-13 DIAGNOSIS — Z3A.19 PREGNANCY WITH 19 COMPLETED WEEKS GESTATION: Primary | ICD-10-CM

## 2023-06-13 DIAGNOSIS — K21.9 GASTROESOPHAGEAL REFLUX DISEASE, UNSPECIFIED WHETHER ESOPHAGITIS PRESENT: ICD-10-CM

## 2023-06-13 LAB
APPEARANCE UR: CLEAR
BACTERIA #/AREA URNS AUTO: ABNORMAL /HPF
BILIRUB SERPL-MCNC: NEGATIVE MG/DL
BILIRUB UR QL STRIP.AUTO: NEGATIVE MG/DL
BLOOD URINE, POC: NEGATIVE
CLARITY, POC UA: NORMAL
COLOR UR: ABNORMAL
COLOR, POC UA: YELLOW
GLUCOSE UR QL STRIP.AUTO: NORMAL MG/DL
GLUCOSE UR QL STRIP: NEGATIVE
HYALINE CASTS #/AREA URNS LPF: ABNORMAL /LPF
KETONES UR QL STRIP.AUTO: NEGATIVE MG/DL
KETONES UR QL STRIP: NEGATIVE
LEUKOCYTE ESTERASE UR QL STRIP.AUTO: 75 UNIT/L
LEUKOCYTE ESTERASE URINE, POC: NORMAL
MUCOUS THREADS URNS QL MICRO: ABNORMAL /LPF
NITRITE UR QL STRIP.AUTO: NEGATIVE
NITRITE, POC UA: NEGATIVE
PH UR STRIP.AUTO: 7.5 [PH]
PH, POC UA: 8.5
PROT UR QL STRIP.AUTO: ABNORMAL MG/DL
PROTEIN, POC: NEGATIVE
RBC #/AREA URNS AUTO: ABNORMAL /HPF
RBC UR QL AUTO: NEGATIVE UNIT/L
SP GR UR STRIP.AUTO: 1.01
SPECIFIC GRAVITY, POC UA: 1.01
SQUAMOUS #/AREA URNS LPF: ABNORMAL /HPF
UROBILINOGEN UR STRIP-ACNC: NORMAL MG/DL
UROBILINOGEN, POC UA: 1
WBC #/AREA URNS AUTO: ABNORMAL /HPF

## 2023-06-13 PROCEDURE — 99213 OFFICE O/P EST LOW 20 MIN: CPT | Mod: PBBFAC

## 2023-06-13 PROCEDURE — 81002 URINALYSIS NONAUTO W/O SCOPE: CPT | Mod: PBBFAC

## 2023-06-13 PROCEDURE — 81001 URINALYSIS AUTO W/SCOPE: CPT

## 2023-06-13 RX ORDER — CALC/MAG/B COMPLEX/D3/HERB 61
15 TABLET ORAL DAILY
Qty: 30 CAPSULE | Refills: 0 | Status: SHIPPED | OUTPATIENT
Start: 2023-06-13 | End: 2024-06-12

## 2023-06-13 NOTE — PROGRESS NOTES
I have discussed the case with the resident and reviewed the resident's history and physical, assessment, plan, and progress note. I agree with the findings.       Neptali Novak MD  [facility]

## 2023-07-05 ENCOUNTER — PROCEDURE VISIT (OUTPATIENT)
Dept: MATERNAL FETAL MEDICINE | Facility: CLINIC | Age: 23
End: 2023-07-05
Payer: MEDICAID

## 2023-07-05 DIAGNOSIS — Z36.89 ENCOUNTER FOR FETAL ANATOMIC SURVEY: ICD-10-CM

## 2023-07-05 DIAGNOSIS — Z36.89 ENCOUNTER FOR FETAL ANATOMIC SURVEY: Primary | ICD-10-CM

## 2023-07-05 PROCEDURE — 76805 OB US >/= 14 WKS SNGL FETUS: CPT | Mod: S$GLB,,, | Performed by: OBSTETRICS & GYNECOLOGY

## 2023-07-05 PROCEDURE — 76805 US MFM PROCEDURE (VIEWPOINT): ICD-10-PCS | Mod: S$GLB,,, | Performed by: OBSTETRICS & GYNECOLOGY

## 2023-07-12 ENCOUNTER — OFFICE VISIT (OUTPATIENT)
Dept: FAMILY MEDICINE | Facility: CLINIC | Age: 23
End: 2023-07-12
Payer: MEDICAID

## 2023-07-12 VITALS
HEART RATE: 84 BPM | OXYGEN SATURATION: 100 % | RESPIRATION RATE: 18 BRPM | TEMPERATURE: 97 F | SYSTOLIC BLOOD PRESSURE: 113 MMHG | BODY MASS INDEX: 33.38 KG/M2 | DIASTOLIC BLOOD PRESSURE: 64 MMHG | WEIGHT: 170 LBS | HEIGHT: 60 IN

## 2023-07-12 DIAGNOSIS — Z3A.23 PREGNANCY WITH 23 COMPLETED WEEKS GESTATION: Primary | ICD-10-CM

## 2023-07-12 LAB
BILIRUB SERPL-MCNC: NORMAL MG/DL
BLOOD URINE, POC: NORMAL
CLARITY, POC UA: CLEAR
COLOR, POC UA: YELLOW
GLUCOSE UR QL STRIP: NORMAL
KETONES UR QL STRIP: NORMAL
LEUKOCYTE ESTERASE URINE, POC: NORMAL
NITRITE, POC UA: NORMAL
PH, POC UA: 7.5
PROTEIN, POC: NORMAL
SPECIFIC GRAVITY, POC UA: 1.02
UROBILINOGEN, POC UA: 0.2

## 2023-07-12 PROCEDURE — 81002 URINALYSIS NONAUTO W/O SCOPE: CPT | Mod: PBBFAC | Performed by: STUDENT IN AN ORGANIZED HEALTH CARE EDUCATION/TRAINING PROGRAM

## 2023-07-12 PROCEDURE — 99213 OFFICE O/P EST LOW 20 MIN: CPT | Mod: PBBFAC | Performed by: STUDENT IN AN ORGANIZED HEALTH CARE EDUCATION/TRAINING PROGRAM

## 2023-07-12 NOTE — PROGRESS NOTES
OB Office Visit Note    Name: Meeta Li  MRN: [id number]  Date: 2023    Subjective:      Chief Complaint: Routine Prenatal Visit ([id number]  pregnant)      Meeta Li is a 22 y.o.  at 23w4d with GUY 2023, by Last Menstrual Period here for routine OB visit.     Current issues: none, states her constipation and what she thought was parasites in her feces are no longer present.     Chronic issues: none    Antepartum specific ROS  - Fetal movements: Yes  - Vaginal bleeding: No  - Vaginal discharge: No  - Loss of fluid: No  - Contractions: No  - Headaches: No  - Vision changes: No  - Edema: No      Review of Systems  Constitutional: no fever, no chills  CV: no chest pain  RESP: no SOB  : no dysuria, no hematuria  GI:  no diarrhea, no nausea, no vomiting  Psych: no depression, no anxiety; No SI/HI      Meds:   Prior to Admission medications    Medication Sig Start Date End Date Taking? Authorizing Provider   lansoprazole (PREVACID) 15 MG capsule Take 1 capsule (15 mg total) by mouth once daily. 5/16/23 5/15/24  Julian Dolan MD   PNV 11-iron fum-folic acid-om3 28 mg iron-1 mg -200 mg Cap Take 1 capsule by mouth Daily. 3/31/23 3/30/24  Oliver Molina Jr., P     Allergies: Review of patient's allergies indicates:  No Known Allergies    Gestational History:   OB History    Para Term  AB Living   3 1 1 0 1 1   SAB IAB Ectopic Multiple Live Births   1 0 0 0 1      # Outcome Date GA Lbr Porfirio/2nd Weight Sex Delivery Anes PTL Lv   3 Current            2 SAB  7w0d          1 Term 19 40w0d  3.3 kg (7 lb 4.4 oz) F Vag-Spont EPI  BENJA           Objective:      Vitals:    23 0909 23 0917   BP: 99/62 113/64   BP Location: Left arm Left arm   Patient Position: Sitting Sitting   BP Method: Large (Automatic) Large (Manual)   Pulse: 84    Resp: 18    Temp: 97.4 °F (36.3 °C)    TempSrc: Oral    SpO2: 100%    Weight: 77.1 kg (170 lb)    Height: 5' (1.524 m)           General:   RESP: clear to auscultation bilaterally, non labored  CV: regular rate and rhythm, no murmurs, no edema  ABD: gravid, nontender, BS+ soft, nontender, nondistended, no abnormal masses, no epigastric pain and FHT present  FHTs: 137 bpm;   Fundal height: 23 cm  Cervix: not digitally examined    Initial OB Labs: Ordered 5/16/23  - Blood Type and Rh: O+  - Antibody Screen: negative  - CBC H/H: 13.5/40.1  - HIV: NR  - RPR: NR  - GC: not detected  - CT: not detected  - HBsAg: NR  - HCVAb: NR  - Rubella: immune  - Varicella: immune  - UA & Culture: UA trace protein, trace bacteria, 0-5 WBC  - Sickle Cell Screen: negative  - PAP: not done, pt is 23 yo    15-20 Weeks: Lab Ordered 5/16/23  - Quad Screen: normal risk    - 20 wk anatomy US: A duncan living IUP is identified.   Fetal size is appropriate for established dating.   No fetal structural malformations are identified.   Cervical length by TA scanning is normal.   Placental location is anterior without evidence of previa.     Imaging  3/31 US OB < 14 wga :Single viable intrauterine pregnancy with sonographic age of 8 weeks 5 days.  Appropriate fetal heart rate of 171 beats per minute.     5/16/23 US OB > 14 wga : Single intrauterine pregnancy with a viable fetus in a breech presentation with a fetal heart rate of 154 beats per minute.   Estimated age by ultrasound 15 weeks and 4 days +/-1 week 1 day.   Estimated date of delivery by ultrasound November 3, 2023   Estimated fetal weight (had lock) 45.3%    Urine dip:  Lab Results   Component Value Date    COLORU Yellow 07/12/2023    SPECGRAV 1.020 07/12/2023    PHUR 7.5 07/12/2023    WBCUR neg 07/12/2023    NITRITE neg 07/12/2023    PROTEINPOC neg 07/12/2023    GLUCOSEUR neg 07/12/2023    KETONESU neg 07/12/2023    UROBILINOGEN 0.2 07/12/2023    BILIRUBINPOC neg 07/12/2023    RBCUR neg 07/12/2023     Assessment/Plan:     Meeta was seen today for routine prenatal visit.    Diagnoses and all orders for  this visit:    Pregnancy with 23 completed weeks gestation  -     POCT urine dipstick without microscope  -     OB Protocol   -     PNVs  -     Urine dip reviewed as above, wnl  -     Routine (initial) labs: reviewed, as mentioned above  -     Labor precautions discussed in depth        Return to clinic in Follow up in about 4 weeks (around 8/9/2023) for routine ob visit.     Maia Marin MD  [facility]

## 2024-10-12 ENCOUNTER — HOSPITAL ENCOUNTER (EMERGENCY)
Facility: HOSPITAL | Age: 24
Discharge: HOME | End: 2024-10-12

## 2024-10-12 VITALS
WEIGHT: 186 LBS | HEART RATE: 96 BPM | DIASTOLIC BLOOD PRESSURE: 94 MMHG | BODY MASS INDEX: 35.12 KG/M2 | OXYGEN SATURATION: 98 % | SYSTOLIC BLOOD PRESSURE: 119 MMHG | TEMPERATURE: 99 F | HEIGHT: 61 IN | RESPIRATION RATE: 20 BRPM

## 2024-10-12 DIAGNOSIS — J02.0 STREPTOCOCCAL SORE THROAT: Primary | ICD-10-CM

## 2024-10-12 LAB
FLUAV AG UPPER RESP QL IA.RAPID: NOT DETECTED
FLUBV AG UPPER RESP QL IA.RAPID: NOT DETECTED
SARS-COV-2 RNA RESP QL NAA+PROBE: NOT DETECTED
STREP A PCR (OHS): DETECTED

## 2024-10-12 PROCEDURE — 25000003 PHARM REV CODE 250: Performed by: NURSE PRACTITIONER

## 2024-10-12 PROCEDURE — 99284 EMERGENCY DEPT VISIT MOD MDM: CPT | Mod: 25

## 2024-10-12 PROCEDURE — 0240U COVID/FLU A&B PCR: CPT | Performed by: NURSE PRACTITIONER

## 2024-10-12 PROCEDURE — 96372 THER/PROPH/DIAG INJ SC/IM: CPT | Performed by: NURSE PRACTITIONER

## 2024-10-12 PROCEDURE — 63600175 PHARM REV CODE 636 W HCPCS: Performed by: NURSE PRACTITIONER

## 2024-10-12 PROCEDURE — 87651 STREP A DNA AMP PROBE: CPT | Performed by: NURSE PRACTITIONER

## 2024-10-12 RX ORDER — AMOXICILLIN 875 MG/1
875 TABLET, FILM COATED ORAL 2 TIMES DAILY
Qty: 20 TABLET | Refills: 0 | Status: SHIPPED | OUTPATIENT
Start: 2024-10-12 | End: 2024-10-22

## 2024-10-12 RX ORDER — ACETAMINOPHEN 325 MG/1
650 TABLET ORAL
Status: COMPLETED | OUTPATIENT
Start: 2024-10-12 | End: 2024-10-12

## 2024-10-12 RX ORDER — DEXAMETHASONE SODIUM PHOSPHATE 4 MG/ML
8 INJECTION, SOLUTION INTRA-ARTICULAR; INTRALESIONAL; INTRAMUSCULAR; INTRAVENOUS; SOFT TISSUE
Status: COMPLETED | OUTPATIENT
Start: 2024-10-12 | End: 2024-10-12

## 2024-10-12 RX ADMIN — ACETAMINOPHEN 325MG 650 MG: 325 TABLET ORAL at 09:10

## 2024-10-12 RX ADMIN — DEXAMETHASONE SODIUM PHOSPHATE 8 MG: 4 INJECTION, SOLUTION INTRA-ARTICULAR; INTRALESIONAL; INTRAMUSCULAR; INTRAVENOUS; SOFT TISSUE at 11:10

## 2024-10-12 NOTE — FIRST PROVIDER EVALUATION
"Medical screening examination initiated.  I have conducted a focused provider triage encounter, findings are as follows:    Brief history of present illness:  25y/o F presents to the ED with fever and throat  pain. Onset three days.    Vitals:    10/12/24 0905   BP: 119/78   Pulse: 105   Resp: 18   Temp: 100 °F (37.8 °C)   TempSrc: Oral   SpO2: 97%   Weight: 84.4 kg (186 lb)   Height: 5' 1.42" (1.56 m)       Pertinent physical exam:  AAA x 3    Brief workup plan:  Labs/Meds    Preliminary workup initiated; this workup will be continued and followed by the physician or advanced practice provider that is assigned to the patient when roomed.  "

## 2024-10-12 NOTE — DISCHARGE INSTRUCTIONS
Thanks for letting us take care of you today!  It is our goal to give you courteous care and to keep you comfortable and informed, if you have any questions before you leave I will be happy to try and answer them.    Here is some advice after your visit:  Please continue to take Tylenol/Motrin for fever and pain  Gargle salt water.  Return back to the if signs and symptoms get worse.      Your visit in the emergency department is NOT definitive care - please follow-up with your primary care doctor and/or specialist within 1-2 days.  Please return if you have any worsening in your condition or if you have any other concerns.        Please take the full course of  any ANTIBIOTICS you were prescribed - incomplete courses of antibiotics can cause resistance to antibiotics in the future which will make it difficult to treat any infections you may have.

## 2024-10-12 NOTE — ED PROVIDER NOTES
Encounter Date: 10/12/2024       History     Chief Complaint   Patient presents with    Sore Throat     Sore throat with swallowing x 3 days with fever with green congestion. TMAX 39 C. Last advil-5pm. Lmp- endd 3 days ago.      See MDM        Review of patient's allergies indicates:  No Known Allergies  History reviewed. No pertinent past medical history.  History reviewed. No pertinent surgical history.  No family history on file.  Social History     Tobacco Use    Smoking status: Never    Smokeless tobacco: Never   Substance Use Topics    Alcohol use: Never    Drug use: Never     Review of Systems   Constitutional:  Positive for fever.   HENT:  Positive for congestion and sore throat.    All other systems reviewed and are negative.      Physical Exam     Initial Vitals [10/12/24 0905]   BP Pulse Resp Temp SpO2   119/78 105 18 100 °F (37.8 °C) 97 %      MAP       --         Physical Exam    Constitutional: She appears well-developed and well-nourished.   HENT:   Head: Normocephalic and atraumatic. Mouth/Throat: Oropharyngeal exudate and posterior oropharyngeal erythema present.   Uvula Midline. No signs of tonsillar abscess noted.    Eyes: EOM are normal. Pupils are equal, round, and reactive to light.   Neck: Neck supple.   Normal range of motion.  Cardiovascular:  Normal rate, regular rhythm and normal heart sounds.           Pulmonary/Chest: Breath sounds normal.   Musculoskeletal:      Cervical back: Normal range of motion and neck supple.           ED Course   Procedures  Labs Reviewed   STREP GROUP A BY PCR - Abnormal       Result Value    STREP A PCR ([facility]) Detected (*)     Narrative:     The Xpert Xpress Strep A test is a rapid, qualitative in vitro diagnostic test for the detection of Streptococcus pyogenes (Group A ß-hemolytic Streptococcus, Strep A) in throat swab specimens from patients with signs and symptoms of pharyngitis.     COVID/FLU A&B PCR - Normal    Influenza A PCR Not Detected      Influenza  B PCR Not Detected      SARS-CoV-2 PCR Not Detected      Narrative:     The Xpert Xpress SARS-CoV-2/FLU/RSV plus is a rapid, multiplexed real-time PCR test intended for the simultaneous qualitative detection and differentiation of SARS-CoV-2, Influenza A, Influenza B, and respiratory syncytial virus (RSV) viral RNA in either nasopharyngeal swab or nasal swab specimens.                Imaging Results    None          Medications   dexAMETHasone injection 8 mg (has no administration in time range)   acetaminophen tablet 650 mg (650 mg Oral Given 10/12/24 0933)     Medical Decision Making  The patient is a 24 y.o. female with no medical history  who presents to the  Emergency Department with a chief complaint of throat pain. Symptoms began three days  and have been getting worse  since the onset. Her  pain is currently rated as a 5/10 in severity and described as pain with no radiation. Associated symptoms include difficulty swallowing with fever. Symptoms are aggravated with swallowing  . She has been taking Advil as a alleviating factors. The patient denies coughing .  She reports other at home with pharyngitis.     History obtained by patient  Differential Diagnosis consist of but not limited too COVID, STREP, FLU    Amount and/or Complexity of Data Reviewed  Labs: ordered.    Risk  OTC drugs.  Prescription drug management.               ED Course as of 10/12/24 1138   Sat Oct 12, 2024   1110 1105 Discuss + strep  test. Patient does reports difficulty swallowing. Decadron will be given IM to help alleviate discomfort.   Discharge treatment discuss with the use of a . Patient reports understanding of plan of care given on today.   Advise to return back to the ER if symptoms get worse despite taking abx given.  [CB]      ED Course User Index  [CB] Nicole Araujo FNP                           Clinical Impression:  Final diagnoses:  [J02.0] Streptococcal sore throat (Primary)          ED  Disposition Condition    Discharge Stable          ED Prescriptions       Medication Sig Dispense Start Date End Date Auth. Provider    amoxicillin (AMOXIL) 875 MG tablet Take 1 tablet (875 mg total) by mouth 2 (two) times daily. for 10 days 20 tablet 10/12/2024 10/22/2024 Nicole Araujo FNP          Follow-up Information    None          Nicole Araujo FNP  10/12/24 113

## 2025-06-17 ENCOUNTER — TELEPHONE (OUTPATIENT)
Dept: INTERNAL MEDICINE | Facility: CLINIC | Age: 25
End: 2025-06-17
Payer: COMMERCIAL

## 2025-06-24 ENCOUNTER — OFFICE VISIT (OUTPATIENT)
Dept: INTERNAL MEDICINE | Facility: CLINIC | Age: 25
End: 2025-06-24
Payer: COMMERCIAL

## 2025-06-24 VITALS
SYSTOLIC BLOOD PRESSURE: 93 MMHG | HEIGHT: 61 IN | DIASTOLIC BLOOD PRESSURE: 65 MMHG | WEIGHT: 181 LBS | TEMPERATURE: 99 F | BODY MASS INDEX: 34.17 KG/M2 | HEART RATE: 98 BPM | OXYGEN SATURATION: 98 %

## 2025-06-24 DIAGNOSIS — E04.9 ENLARGED THYROID: ICD-10-CM

## 2025-06-24 DIAGNOSIS — Z00.00 WELLNESS EXAMINATION: ICD-10-CM

## 2025-06-24 DIAGNOSIS — G43.809 OTHER MIGRAINE WITHOUT STATUS MIGRAINOSUS, NOT INTRACTABLE: Primary | ICD-10-CM

## 2025-06-24 DIAGNOSIS — G89.29 CHRONIC PAIN OF RIGHT ANKLE: ICD-10-CM

## 2025-06-24 DIAGNOSIS — M25.571 CHRONIC PAIN OF RIGHT ANKLE: ICD-10-CM

## 2025-06-24 PROCEDURE — 99204 OFFICE O/P NEW MOD 45 MIN: CPT | Mod: ,,, | Performed by: STUDENT IN AN ORGANIZED HEALTH CARE EDUCATION/TRAINING PROGRAM

## 2025-06-24 PROCEDURE — 1159F MED LIST DOCD IN RCRD: CPT | Mod: CPTII,,, | Performed by: STUDENT IN AN ORGANIZED HEALTH CARE EDUCATION/TRAINING PROGRAM

## 2025-06-24 PROCEDURE — 3074F SYST BP LT 130 MM HG: CPT | Mod: CPTII,,, | Performed by: STUDENT IN AN ORGANIZED HEALTH CARE EDUCATION/TRAINING PROGRAM

## 2025-06-24 PROCEDURE — 3008F BODY MASS INDEX DOCD: CPT | Mod: CPTII,,, | Performed by: STUDENT IN AN ORGANIZED HEALTH CARE EDUCATION/TRAINING PROGRAM

## 2025-06-24 PROCEDURE — 3078F DIAST BP <80 MM HG: CPT | Mod: CPTII,,, | Performed by: STUDENT IN AN ORGANIZED HEALTH CARE EDUCATION/TRAINING PROGRAM

## 2025-06-24 PROCEDURE — 1160F RVW MEDS BY RX/DR IN RCRD: CPT | Mod: CPTII,,, | Performed by: STUDENT IN AN ORGANIZED HEALTH CARE EDUCATION/TRAINING PROGRAM

## 2025-06-24 RX ORDER — SUMATRIPTAN SUCCINATE 25 MG/1
TABLET ORAL
Qty: 30 TABLET | Refills: 1 | Status: SHIPPED | OUTPATIENT
Start: 2025-06-24

## 2025-06-30 PROBLEM — G43.909 MIGRAINE WITHOUT STATUS MIGRAINOSUS, NOT INTRACTABLE: Status: ACTIVE | Noted: 2025-06-30

## 2025-06-30 PROBLEM — Z00.00 WELLNESS EXAMINATION: Status: ACTIVE | Noted: 2025-06-30

## 2025-06-30 PROBLEM — E04.9 ENLARGED THYROID: Status: ACTIVE | Noted: 2025-06-30

## 2025-06-30 PROBLEM — M25.571 CHRONIC PAIN OF RIGHT ANKLE: Status: ACTIVE | Noted: 2025-06-30

## 2025-06-30 PROBLEM — G89.29 CHRONIC PAIN OF RIGHT ANKLE: Status: ACTIVE | Noted: 2025-06-30

## 2025-06-30 NOTE — PROGRESS NOTES
Subjective:      Meeta Li  06/30/2025  [id number]      Chief Complaint: Establish Care (Pt is here to establish care.)       History of Present Illness    Ms Tim is a 23 y/o female patient who is here to establish care. Patient presents today for weight management concerns. She reports current weight of 181 lbs with ongoing difficulty losing weight despite dietary modifications, including avoiding sugar, rice, and flour. She suspects thyroid involvement due to family history and is interested in exploring medical interventions for weight management. Her maternal family has a history of thyroid problems. Her mother, who has thyroid issues, noted that the patient's thyroid appeared enlarged. She reports recurrent headaches 2-3 times weekly. Migraine episodes occur every 1-2 months with aura, characterized by visual disturbances (seeing dots or lights) preceding severe unilateral headache, typically affecting the left side. Associated symptoms include nausea and sensitivity to light and sound. Over-the-counter medications taken at symptom onset provide limited effectiveness. She reports two recent episodes of chest pain occurring approximately three weeks ago. Pain is described as a pressing sensation localized to the chest, exacerbated by movement, with associated difficulty breathing. One episode lasted an entire afternoon. This is her first time discussing these symptoms. She reports persistent right ankle pain and swelling following a twisting injury two years ago. The injury has not been comprehensively evaluated. She reports multiple small, itchy dots on her arms causing skin staining, consistent with possible allergic reaction and dryness.  She avoids creams with additional essences due to concern for allergic reactions. She recently had a Pap smear at the [facility] clinic.           History reviewed. No pertinent past medical history.  History reviewed. No pertinent surgical history.  Family History   Problem  "Relation Name Age of Onset    Hypertension Mother       Social History     Tobacco Use    Smoking status: Never    Smokeless tobacco: Never   Substance and Sexual Activity    Alcohol use: Never    Drug use: Never    Sexual activity: Yes     Review of patient's allergies indicates:  No Known Allergies    The following were reviewed at this visit: active problem list, medication list, allergies, family history, social history, and health maintenance.    Medications:  Current Medications[1]      Medications have been reviewed and reconciled with patient at this visit.  Barriers to medications reviewed with patient.    Adverse reactions to current medications reviewed with patient..    Over the counter medications reviewed and reconciled with patient.  Review of Systems   Constitutional:  Negative for chills, fever, malaise/fatigue and weight loss.   HENT:  Negative for congestion, ear discharge, ear pain, hearing loss, sinus pain and sore throat.    Eyes:  Negative for photophobia, pain, discharge and redness.   Respiratory:  Negative for cough, shortness of breath and wheezing.    Cardiovascular:  Negative for chest pain, palpitations and leg swelling.   Gastrointestinal:  Negative for constipation, diarrhea, heartburn, nausea and vomiting.   Genitourinary:  Negative for dysuria, frequency and urgency.   Musculoskeletal:  Negative for falls, joint pain and myalgias.   Skin:  Negative for itching and rash.   Neurological:  Negative for dizziness, focal weakness, weakness and headaches.   Psychiatric/Behavioral:  Negative for depression and memory loss. The patient is not nervous/anxious and does not have insomnia.            Objective:      Vitals:    06/24/25 1320   BP: 93/65   BP Location: Left arm   Patient Position: Sitting   Pulse: 98   Temp: 98.5 °F (36.9 °C)   SpO2: 98%   Weight: 82.1 kg (181 lb)   Height: 5' 1" (1.549 m)       Physical Exam  Constitutional:       General: She is not in acute distress.     " Appearance: Normal appearance.   HENT:      Head: Normocephalic and atraumatic.   Eyes:      Extraocular Movements: Extraocular movements intact.      Pupils: Pupils are equal, round, and reactive to light.   Cardiovascular:      Rate and Rhythm: Normal rate and regular rhythm.      Pulses: Normal pulses.      Heart sounds: Normal heart sounds.   Pulmonary:      Effort: Pulmonary effort is normal.      Breath sounds: Normal breath sounds.   Abdominal:      General: Abdomen is flat. Bowel sounds are normal. There is no distension.      Palpations: Abdomen is soft.      Tenderness: There is no abdominal tenderness.   Musculoskeletal:         General: Normal range of motion.      Cervical back: Normal range of motion and neck supple.      Right lower leg: No edema.      Left lower leg: No edema.   Lymphadenopathy:      Cervical: No cervical adenopathy.   Skin:     Findings: No lesion or rash.   Neurological:      General: No focal deficit present.      Mental Status: She is alert and oriented to person, place, and time.               Assessment and Plan:       1. Other migraine without status migrainosus, not intractable  Assessment & Plan:  Will prescribe sumatriptan as needed for migraines       2. Chronic pain of right ankle  Assessment & Plan:  Will refer to PT    Orders:  -     Ambulatory Referral/Consult to Physical Therapy    3. Enlarged thyroid  Assessment & Plan:  Will check TSH, free T4 and free T3     Orders:  -     T4, Free; Future; Expected date: 06/24/2025  -     T3, Free ([facility]); Future; Expected date: 06/24/2025    4. Wellness examination  Assessment & Plan:  Labs: due, ordered today     Orders:  -     CBC Auto Differential; Future; Expected date: 06/24/2025  -     Comprehensive Metabolic Panel; Future; Expected date: 06/24/2025  -     Lipid Panel; Future; Expected date: 06/24/2025  -     TSH; Future; Expected date: 06/24/2025  -     Hemoglobin A1C; Future; Expected date: 06/24/2025    Other orders  -      sumatriptan (IMITREX) 25 MG Tab; Take 1 tablet one time as needed for migraines. May repeat after 2 h if needed. Do not exceed more than 4 doses/day  Dispense: 30 tablet; Refill: 1            Follow up: Follow up in about 1 year (around 6/24/2026) for wellness, Labs check.             [1]   Current Outpatient Medications:     sumatriptan (IMITREX) 25 MG Tab, Take 1 tablet one time as needed for migraines. May repeat after 2 h if needed. Do not exceed more than 4 doses/day, Disp: 30 tablet, Rfl: 1

## 2025-07-21 ENCOUNTER — PATIENT MESSAGE (OUTPATIENT)
Dept: ADMINISTRATIVE | Facility: HOSPITAL | Age: 25
End: 2025-07-21
Payer: COMMERCIAL

## 2025-07-22 ENCOUNTER — PATIENT OUTREACH (OUTPATIENT)
Facility: CLINIC | Age: 25
End: 2025-07-22
Payer: COMMERCIAL

## 2025-07-22 NOTE — PROGRESS NOTES
Health Maintenance Topic(s) Outreach Outcomes & Actions Taken:    Cervical Cancer Screening - Outreach Outcomes & Actions Taken  : External Records Requested & Care Team Updated if Applicable and REE sent to [facility]     Additional Notes:

## 2025-07-22 NOTE — LETTER
AUTHORIZATION FOR RELEASE OF CONFIDENTIAL INFORMATION      2025      Dear [facility] ,    We are seeing Meeta Li, date of birth 2000, in the clinic at [address].  Lesley Leyva MD is the patient's PCP. Meeta Li has an outstanding lab/procedure at the time we reviewed his chart.  In order to help keep her health information updated, Meeta has authorized us to request the following medical record(s):        Pap Smear           Please fax any records to Lesley Leyva MD's at  [phone]        If you have any questions you can contact Edelmira Davenport at [phone].             Patient Name: Meeta Li  :2000  Patient Phone #:[phone]                                       Meeta Li  MRN: [id number]  : 2000  Age: 24 y.o.  Sex: female          Firma del paciente o  legal Fecha/Hora          Consentimiento para Examen y Tratamiento  Yo, por dian medio, autorizo a los proveedores y empleados del [facility]) a brindar tratamiento y servicios médicos, que incluye cari no se limita a realizar y administrar pruebas y procedimientos diagnósticos que se consideren necesarios, incluyendo, cari no limitándose a exámenes de imágenes, pruebas sanguíneas y otros procedimientos de laboratorio que puedan ser requeridos por el hospital, clínica, o puedan ser ordenados por mi médico(s) o personas que trabajen bajo las instrucciones generales o especiales de mi médico(s).  1. Yo entiendo y estoy de acuerdo que dian consentimiento cubre todas las personas autorizadas, incluyendo cari nolimitándose a médicos, residentes, profesionales de enfermería, asistentes de médicos, especialistas, consultores, enfermeras estudiantes, y médicos contratados independientemente, que ivan llamados por el médico encargado, para llevar a cabo los procedimientos diagnósticos y tratamiento médico o quirúrgico.  2. Por  la presente autorizo a [facility] a conservar o destruir cualquier espécimen o tejido, en dorys de que quedara alguno de cualquier prueba o procedimiento.  3. Por la presente autorizo y doy mi consentimiento para que los proveedores y empleados de [facility] tomen  fotografías, imágenes o videocintas de tales procedimientos diagnósticos, quirúrgicos o de tratamiento del Pacienteque ivan requeridos por [facility] o que ivan ordenados por un médico. Yo además declaro y estoy de acuerdo en que [facility] puede usar cámaras u otros dispositivos para monitorear pacientes.  4. Yo estoy consciente de que la práctica de la medicina no es jose m ciencia exacta, y reconozco que no se me odonnell dado garantías en cuanto al resultado de cualquiera de las pruebas, procedimientos o tratamiento.  5. [place] parte de rodriguez prestación de [facility], se le ofrecerá jose m vacuna contra el COVID-19. Algunos criterios de elegibilidad podrán ser respaldados bajo la Autorización de Uso de Emergencia (EUA). Comunique a rodriguez equipo médico si desea recibir la vacuna contra el COVID-19 chelsey esta hospitalización.      B. Autorización para Aleksander Información:  Entiendo que mi compañía de seguros y/o veronica agentes pueden necesitar información necesaria para carlos jose m determinación sobre el pago/reembolso. Por dian medio, yo otorgo autorización para aleksander a todas las compañías de seguros, veronica sucesores, cesionarios, otras partes con quien puedan tener contrato u otras que actúan en rodriguez nombre, que están involucradas con el pago de cualquier cargo de hospital y/o clínica incurrido por el paciente, cualquier información que ellos soliciten y consideren necesaria para el pago/reembolso y/o revisión de calidad. Autorizo además la divulgación de mi información médica a médicos u otros profesionales de la jerry en plantilla que estén participando en mi cuidado médico ahora y en el futuro, y a otros proveedores, entidades, o instituciones del cuidado de la jerry para fines de mi  cuidado y tratamiento continuo, incluyendo referidos.  C. Certificación y Autorización del Paciente de Medicare para wayne información y solicitud de pago:   Yo certifico que la información fariha por mí en la solicitud de pago, en conformidad con el Título XVIII del Acta de Seguro Social, es correcta. Yo autorizo a cualquier poseedor de información médica o de otra clase referente a mi persona a wayne a la Administración del Seguro Social o a veronica intermediarios o mensajeros cualquier información necesaria para dian o cualquier otro reclamo relacionado con Medicare. Yo solicito que el pago de beneficios autorizados sea hecho en mi nombre.  D. Asignación de Beneficios de Seguro:  Por la presente autorizo a cualquiera y a todas las compañías de seguros, planes de jerry, planes de beneficios definidos, aseguradores de jerry o cualquier entidad que sea o que pueda ser responsable del pago de mis gastos médicos a que paguen todos los beneficios médicos y de hospital adeudados ahora y más tarde y pagaderos a mi persona bajo cualquier beneficio de hospital, beneficio por enfermedad, beneficios por lesión, o cualquier otro beneficio por servicios prestados a mi persona, incluyendo Beneficios Médicos May[facility], directamente a [facility], y a todos los médicos contratados independientemente.  Cedo cualquier y todos los derechos que yo pueda tener contra cualquier y todas las compañías de seguro, planes de jerry, planes de beneficios definidos, aseguradores de jerry, o cualquier entidad que sea o que pueda ser responsable del pago de mis gastos médicos, incluyendo cari no limitándose a cualquier derecho de apelar la denegación de un reclamo, cualquier derecho a entablar cualquier acción, demanda, procedimiento administrativo, u otro derecho de acción en mi nombre. Yo específicamente cedo mi derecho a entablar litigio contra cualquier y todas las compañías de seguro, planes de jerry, planes de beneficios definidos, aseguradores de jerry o  cualquier entidad que sea o pueda ser responsable del pago de mis gastos  médicos basado en jose m denegación de pagar los cargos.     [id number] PARA REGISTRO  Form No. 73812-B (Rev. 09/19/2018) Página 1 de 2     E. Objetos de valor:  Entiendo y estoy de acuerdo que [facility] no se hace responsable por el daño o pérdida de cualquier dinero, joyas, documentos, dentaduras postizas, anteojos, aparatos del oído, aparatos prostéticos y otros objetos de valor.  F. Equipo de computadora:  Entiendo y estoy de acuerdo que en dorys de que yo elija usar equipo de computadora propiedad de [facility] o si yo elijo entrar en la Internet por la red de [facility], lo hago bajo mi responsabilidad. [facility] no es responsable por daño alguno a mi equipo de computadora o por daño alguno de cualquier clase que pudiera resultar de la pérdida de mi equipo o datos.  G. Aceptación de Responsabilidad Financiera:  Yo estoy de acuerdo que en consideración de los servicios y suministros que odonnell sido o serán proporcionados al paciente, por dian medio estoy obligado(a) a pagar todos los cargos en la cuenta del paciente de acuerdo a las tarifas normales (en efecto al momento en que los servicios y suministros ivan recibidos) establecidas por [facility], incluyendo rodriguez Póliza de Asistencia Financiera al Paciente hasta donde sea aplicable. Yo entiendo que soy responsable de todos los cargos, o porciones de los mismos, que no ivan cubiertos por el seguro u otras garcia. Los reembolsos del paciente serán distribuidos solamente después de que los saldos de cuentas en todas las instalaciones de [facility] estén pagados.  H. Autorización de comunicación: Por la presente autorizo a [facility] y a veronica representantes, junto con cualquier servicio de facturación o agente de cobros que trabajen para ellos, para que se comuniquen conmigo a mi teléfono celular o de mi casa usando mensajes pregrabados o de voz artificial, dispositivos de discado automático u otra tecnología  asistida por computadora, o por correo electrónico, mensajes de texto, o por cualquier otra forma de comunicación electrónica. [place] incluye cari no se limita a recordatorios de citas , recordatorios de examen físico anual, recordatorios de cuidado preventivo, campañas de pacientes, llamadas de paula, y llamadas acerca del saldo en mi cuenta o alguna cuenta en la que yo aparezca linda garante. Entiendo que tengo el derecho de excluirme de estas comunicaciones en cualquier momento.  I. Relación entre el centro médico y el Médico:  Entiendo que algunos, cari no todos, proveedores que brindan servicios al paciente, no son empleados o agentes de [facility]. El paciente está bajo el cuidado y supervisión de rodriguez médico a cargo, y es responsabilidad del centro médico y rodriguez personal de enfermería llevar a cabo las instrucciones de tales médicos. Es la responsabilidad del médico/designado del paciente obtener el consentimiento informado del paciente, cuando se requiera, para tratamiento médico o quirúrgico, procedimientos terapéuticos o diagnósticos especiales, o servicios de hospital prestados al paciente bajo las instrucciones especiales del  médico o rodriguez designado.  J. Acuso de Recibo de Información sobre Cesación de Fumar: Certifico que he recibido la hoja de información sobre Cómo dejar de fumar.  K. Notificación sobre Normas de Privacidad: Acuso recibo de copia de Notificación sobre Normas de Privacidad de [facility].  L. Directorio del Hospital: He hablado con la organización sobre mi deseo de que se me incluya o excluya en el directorio de la instalación. Entiendo que, si mi elección es de optar por no ser identificado en el directorio de la instalación, que la instalación no proporcionará información alguna sobre mí, carol linda mi condición (por ejemplo, buena, estable, etc.) o mi ubicación en la instalación (p.ej., número de habitación, departamento).  RUBIN SERNA: [facility] es un centro participante de LINKS (Red Estatal de  [facility]ac[facility]ación [facility]). LINKS es un sistema informático confidencial patrocinado por el DSH que le ayuda a usted y a rodriguez médico estar al tanto de la historia de vacunación suya y la de rodriguez shweta. Declaro que le estoy permitiendo a [facility] compartir esta información con LINKS.  N. PLAZO: Esta autorización es válida para dian y subsiguiente cuidado /tratamiento médico que yo reciba en [facility] y permanecerá válida a menos que o hasta que sea revocada por mí por escrito.  O. SISTEMA DE HEMALATHA [facility]: Juan Antonio linda se usa en dian documento, [facility] significa todas las entidades afiliadas a [facility] incluyendo todos los centros de hemalatha, centros de cirugía, clínicas y hospitales. Incluye, más específicamente, las siguientes entidades: [facility], jose m [facility] sin fines de lucro, y veronica subsidiarias y afiliadas, incluyendo [facility], [facility], [facility], [facility] d/b/a [facility], [facility], [facility] d/b/a [facility] [employer], L.L[employer]CAmparo linda administrador de Luis E LUNA [facility], [facility]., [facility] [employer], L.L[facility]C. d/b/a [facility], [facility] y [facility] linda administrador de [facility].  ________________________________________________  AUTORIZACION PARA REGISTRO  Form No. [id number] (Rev. 09/19/2018) Página 2 de 2  [facility] cumple con las leyes federales de derechos civiles aplicables y no discrimina por motivos de crista, color, nacionalidad, edad,  discapacidad o sexo.  ATENCIÓN: Si habla salud, tiene a rodriguez  disposición servicios gratlisandro de asistencia lingüística. Llame al [phone].  _______________________________________________________________________________________________________________________________